# Patient Record
Sex: FEMALE | Race: WHITE | Employment: OTHER | URBAN - METROPOLITAN AREA
[De-identification: names, ages, dates, MRNs, and addresses within clinical notes are randomized per-mention and may not be internally consistent; named-entity substitution may affect disease eponyms.]

---

## 2017-11-03 ENCOUNTER — HOSPITAL ENCOUNTER (OUTPATIENT)
Dept: MAMMOGRAPHY | Age: 72
Discharge: HOME | End: 2017-11-03
Payer: MEDICARE

## 2017-11-03 DIAGNOSIS — Z12.31 VISIT FOR SCREENING MAMMOGRAM: ICD-10-CM

## 2017-11-03 PROCEDURE — 77063 BREAST TOMOSYNTHESIS BI: CPT

## 2018-11-29 ENCOUNTER — HOSPITAL ENCOUNTER (OUTPATIENT)
Dept: MAMMOGRAPHY | Age: 73
Discharge: HOME | End: 2018-11-29
Payer: MEDICARE

## 2018-11-29 DIAGNOSIS — Z12.31 VISIT FOR SCREENING MAMMOGRAM: ICD-10-CM

## 2018-11-29 PROCEDURE — 77063 BREAST TOMOSYNTHESIS BI: CPT

## 2019-12-04 ENCOUNTER — HOSPITAL ENCOUNTER (OUTPATIENT)
Dept: MAMMOGRAPHY | Age: 74
Discharge: HOME | End: 2019-12-04
Attending: INTERNAL MEDICINE
Payer: MEDICARE

## 2019-12-04 DIAGNOSIS — Z12.31 VISIT FOR SCREENING MAMMOGRAM: ICD-10-CM

## 2019-12-04 PROCEDURE — 77063 BREAST TOMOSYNTHESIS BI: CPT

## 2020-11-04 ENCOUNTER — TRANSCRIBE ORDER (OUTPATIENT)
Dept: SCHEDULING | Age: 75
End: 2020-11-04

## 2020-11-04 DIAGNOSIS — Z12.31 VISIT FOR SCREENING MAMMOGRAM: Primary | ICD-10-CM

## 2020-11-14 ENCOUNTER — HOSPITAL ENCOUNTER (OUTPATIENT)
Dept: MAMMOGRAPHY | Age: 75
Discharge: HOME | End: 2020-11-14
Attending: INTERNAL MEDICINE
Payer: MEDICARE

## 2020-11-14 DIAGNOSIS — Z12.31 VISIT FOR SCREENING MAMMOGRAM: ICD-10-CM

## 2020-11-14 PROCEDURE — 77063 BREAST TOMOSYNTHESIS BI: CPT

## 2021-11-16 ENCOUNTER — TRANSCRIBE ORDER (OUTPATIENT)
Dept: SCHEDULING | Age: 76
End: 2021-11-16

## 2021-11-16 DIAGNOSIS — Z12.31 VISIT FOR SCREENING MAMMOGRAM: Primary | ICD-10-CM

## 2021-11-26 ENCOUNTER — HOSPITAL ENCOUNTER (OUTPATIENT)
Dept: WOMENS IMAGING | Age: 76
Discharge: HOME | End: 2021-11-26
Attending: FAMILY MEDICINE
Payer: MEDICARE

## 2021-11-26 DIAGNOSIS — Z12.31 VISIT FOR SCREENING MAMMOGRAM: ICD-10-CM

## 2021-11-26 PROCEDURE — 77063 BREAST TOMOSYNTHESIS BI: CPT

## 2021-12-21 ENCOUNTER — HOSPITAL ENCOUNTER (OUTPATIENT)
Dept: PHYSICAL THERAPY | Age: 76
Discharge: HOME | End: 2021-12-21
Payer: MEDICARE

## 2021-12-21 PROCEDURE — 97162 PT EVAL MOD COMPLEX 30 MIN: CPT

## 2021-12-21 NOTE — PROGRESS NOTES
PHYSICAL THERAPY - DAILY TREATMENT NOTE    Patient Name: Ivy Garcia        Date: 2021  : 1945   YES Patient  Verified  Visit #:     Insurance: Payor: Roberta Marianoiver / Plan: [place] MEDICARE PART A & B / Product Type: Medicare /      In time: 8:07 Out time: 8:47   Total Treatment Time: 40     Medicare Time Tracking (below)   Total Timed Codes (min):  0 1:1 Treatment Time:  0     TREATMENT AREA =  Lumbar radiculopathy    SUBJECTIVE    Pain Level (on 0 to 10 scale):  6  / 10   Medication Changes/New allergies or changes in medical history, any new surgeries or procedures? NO    If yes, update Summary List   Subjective Functional Status/Changes:  []  No changes reported     States she began experiencing mild back pain after CHELY in  and . States she has been doing exercises in the water which significantly helped the pain. Although states she continued to have episodes of pain. She ambulates with a rollator today and states she has used this since she fell on 10/24/2021. Prior to fall she was using a SPC in her home and community. States she has had increased pain in her low back and down her right leg (to knee) since the fall. States she was using a SPC in her back yard when she fell. She reports 1 other fall in the past 6 months. She lives alone during the weeks and with her son on the weekends. Her son is special needs. States she cannot lie on right side     She went to ER after the fall because she could not get up. States x rays were taken.      Patient expresses fear of falling several times throughout eval.         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 0/10 Psoas (L2,3) 3 (p! 6/10) 4   Ext 25% P! 0/10 Quads (L3) 5 5   R-lat flex 50% P! 0/10 Ant tib(L4)     L-lat flex 50% P! 6/10 Ext Martínez (L4,L5)     R-rot   Glut Med(L5) 2 (p! 8/10) 4   L-rot   Peroneals (L5,S1)        Hamstrings(S1,S2) 3 (p! 8/10) 4     Strength (MMT)       Right     Left          Gastroc (S1,S2)     Glut Max (S1,S2) 3 3        Core: Sup Bridge     Core: Side Bridge     Core: Prone plank            Special Tests                       Right     Left          Flexibility         Right              Left    Slump neg  90/90 HS WNL WNL   SLR neg  Tray Lemmings     Sl screen 3/5=70% LR   Camille     Gaenslen test   Hip IR (prone)     Avi/DANYEL sign   Hip ER (prone)     Thigh thrust        Distraction        Lateral Compression                  Effect of:  DKC    Supine Bridge    SL Supine Bridge    Prone on Elbows    RFIS    REIL             min Patient Education:  YES  Reviewed HEP   []  Progressed/Changed HEP based on:   Discussed POC with patient      Other Objective/Functional Measures:    See eval    ABC Scale: 11.25%     Post Treatment Pain Level (on 0 to 10) scale:   6  / 10     ASSESSMENT  Assessment/Changes in Function:     Justification for Eval Code Complexity:  Patient History (low 0, mod 1-2, high 3-4): high (arthritis, heart murmur, osteoporosis, B CHELY, h/o cataract removal)  Examination (low 1-2, mod 3+, high 4+): mod (see above)  Clinical Presentation (low stable or uncomplicated, mod evolving or changing, high unstable or unpredictable): mod  Clinical Decision Making (low , mod 26-74, high 1-25): FOTO = 36/100     []  See Progress Note/Recertification   Patient will continue to benefit from skilled PT services to modify and progress therapeutic interventions, address functional mobility deficits, address ROM deficits, address strength deficits, analyze and address soft tissue restrictions, analyze and cue movement patterns, analyze and modify body mechanics/ergonomics, assess and modify postural abnormalities, address imbalance/dizziness and instruct in home and community integration to attain remaining goals. Progress toward goals / Updated goals:    Goals established. PLAN    [x]  Upgrade activities as tolerated YES Continue plan of care   []  Discharge due to :    []  Other:      Therapist: Amy Ellis PT    Date: 12/21/2021 Time: 8:16 AM     No future appointments.

## 2021-12-21 NOTE — PROGRESS NOTES
[address] [facility] PHYSICAL THERAPY  [address] [place] [address] Robert Chisholm, [address] [employer] [address] - Phone: [phone]  Fax: [phone] / 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   ===========================================================================================  Assessment / key information:  Patient is a 68y.o. year old female with chief complaint of low back and right leg pain after a fall in her backyard on 10/24/2021. Prior to fall she had been using a SPC for home and community ambulation; now she is using a rollator. Patient reports a fear of falling several times throughout the eval.  Patient is unable to report activities which decrease pain, but she states she is unable to lie on right side due to pain. Objective findings: 1) decreased lumbar AROM, 2) decreased strength in right LE > left LE, 3) tenderness to palpation over right posterior greater trochanter and down iliotibial band, 4) negative SLR and slump tests, 5) score of 11.25% on the Activities-specific Balance Confidence Scale, indicating indicating a low level of physical functioning and an increased fall risk. Patient will benefit from skilled physical therapy services to address these issues.   Recommend assessing and treating imbalance to improve safety with gait in addiction to treatment for lumbar radiculopathy. Thank you for this referral.     Clementina Edelmira (Focused on Therapeutic Outcomes) Functional Status score = 36/100, which corresponds to a functional limitation of 64%.     L/S ROM      Range   Effect  Strength (MMT)          Right        Left    Flex 50% P! 0/10 Psoas (L2,3) 3 (p! 6/10) 4   Ext 25% P! 0/10 Quads (L3) 5 5   R-lat flex 50% P! 0/10 Ant tib(L4)       L-lat flex 50% P! 6/10 Ext Martínez (L4,L5)       R-rot     Glut Med(L5) 2 (p! 8/10) 4   L-rot     Peroneals (L5,S1)             Hamstrings(S1,S2) 3 (p! 8/10) 4      Strength (MMT)       Right     Left          Gastroc (S1,S2)       Glut Max (S1,S2) 3 3       ===========================================================================================  Eval Complexity: History: HIGH Complexity :3+ comorbidities / personal factors will impact the outcome/ POC Exam:MEDIUM Complexity : 3 Standardized tests and measures addressing body structure, function, activity limitation and / or participation in recreation  Presentation: MEDIUM Complexity : Evolving with changing characteristics  Clinical Decision Making:MEDIUM Complexity : FOTO score of 26-74Overall Complexity:MEDIUM    Problem List: pain affecting function, decrease ROM, decrease strength, impaired gait/ balance, decrease ADL/ functional abilitiies, decrease activity tolerance, decrease flexibility/ joint mobility and decrease transfer abilities   Treatment Plan may include any combination of the following: Therapeutic exercise, Therapeutic activities, Neuromuscular re-education, Physical agent/modality, Gait/balance training, Manual therapy and Patient education  Patient / Family readiness to learn indicated by: asking questions, trying to perform skills and interest  Persons(s) to be included in education: patient (P)  Barriers to Learning/Limitations: None  Measures taken:    Patient Goal (s): \"stop the pain\"   Patient self reported health status: good  Rehabilitation Potential: good   Short Term Goals: To be accomplished in  3-4  weeks:  1. Complete Functional Gait Assessment (FGA) or Tinetti to further assess fall risk. 2.  Patient will be compliant with home exercise program.  3.  Complete Wilburn Balance Scale to assess safety with ADL's.  Long Term Goals: To be accomplished in  6-8  weeks:  1. Patient will increase FOTO Functional Status score to 58/100 to indicate decreased functional limitations. 2.  Patient will report little difficulty with getting in and out of a chair to improve safety and independence with functional mobility. 3.  Patient will be independent with home exercise program for self management of symptoms. 4.  Patient will report little difficulty with bathing and dressing to improve safety and independence with self care activities. 5.  Patient will increase score on FGA or Tinetti by 4 points, if applicable, to increase safety with ADL's and gait. Frequency / Duration:   Patient to be seen  2  times per week for 6-8  weeks:  Patient / Caregiver education and instruction: self care    Therapist Signature: Arnold Hernandez PT Date: 26/35/7527   Certification Period: 12/21/2021 - 3/20/2022 Time: 9:20 AM   ===========================================================================================  I certify that the above Physical Therapy Services are being furnished while the patient is under my care. I agree with the treatment plan and certify that this therapy is necessary. Physician Signature:        Date:       Time:                                         Rios Oneill MD    Please sign and return to [employer] or you may fax the signed copy to [phone]. Thank you.

## 2022-01-03 ENCOUNTER — HOSPITAL ENCOUNTER (OUTPATIENT)
Dept: PHYSICAL THERAPY | Age: 77
Discharge: HOME | End: 2022-01-03
Payer: MEDICARE

## 2022-01-03 PROCEDURE — 97110 THERAPEUTIC EXERCISES: CPT

## 2022-01-03 PROCEDURE — 97530 THERAPEUTIC ACTIVITIES: CPT

## 2022-01-03 NOTE — PROGRESS NOTES
PHYSICAL THERAPY - DAILY TREATMENT NOTE    Patient Name: Leslye Fill        Date: 1/3/2022  : 1945   YES Patient  Verified  Visit #:     Insurance: Payor: Shad Nones / Plan: [place] MEDICARE PART A & B / Product Type: Medicare /      In time: 10:30 Out time: 11:20   Total Treatment Time: 50     Medicare/BCBS [place] Time Tracking (below)   Total Timed Codes (min):  50 1:1 Treatment Time:  50     TREATMENT AREA =  Other low back pain [M54.59]    SUBJECTIVE    Pain Level (on 0 to 10 scale):  8  / 10 left>right LE pain   Medication Changes/New allergies or changes in medical history, any new surgeries or procedures? NO    If yes, update Summary List   Subjective Functional Status/Changes:  []  No changes reported     Pt reports on Tuesday she was at her doctor's office for a regular appointment, her left knee was red, hot and swollen so he told her to go to the ER for the pain and swelling in her left knee, they increased her fluid pill and she goes to see her heart doctor in February, but has called them to see if she can get in any earlier. Pt reports the swelling has improved a little, but she still can't get her compression sock on. Pt states her left leg drags when she walks, has been told her legs might be different lengths from her hip surgery. Pt reports her right leg from the sciatica isn't as painful as the left one, but she still can't lift her right leg. OBJECTIVE    30 min Therapeutic Exercise:  [x]  See flow sheet   Rationale:    increase ROM, increase strength, improve coordination, improve balance and increase proprioception to promote increased functional mobility and increased activity tolerance with ADL's.     20 min Therapeutic Activity: Tinetti   static standing balance    Rationale:    increase ROM, increase strength, improve coordination, improve balance and increase proprioception to promote increased functional mobility and increased activity tolerance with ADL's.      min Patient Education:  YES  Reviewed HEP   []  Progressed/Changed HEP based on:   Educated pt on lymphedema for treatment of chronic LE swelling, advised pt to discuss this with her heart doctor to see if it's an option for her, pt agreeable. Other Objective/Functional Measures:    Pt arrived 13' late for appointment advising she had difficulty getting dressed and walking because of her left>right leg pain. Pt presents to PT ambulating with rollator and antalgic gait, decreased stance time left LE. Pt demo's LE edema from knee down left>right LE.     ROM EO/EC 30\"/10\"   Tinetti: 8/28      Post Treatment Pain Level (on 0 to 10) scale:   8  / 10     ASSESSMENT    Assessment/Changes in Function:     Pt's Tinetti Balance assessment score indicates pt at high risk for falling. []  See Progress Note/Recertification   Patient will continue to benefit from skilled PT services to modify and progress therapeutic interventions, address functional mobility deficits, address ROM deficits, address strength deficits, analyze and address soft tissue restrictions, analyze and cue movement patterns, assess and modify postural abnormalities, and instruct in home and community integration to attain remaining goals. Progress toward goals / Updated goals:    1. Complete Functional Gait Assessment (FGA) or Tinetti to further assess fall risk. completed Tinetti 1/3/22=8/28  2. Patient will be compliant with home exercise program.  3.  Complete Wilburn Balance Scale to assess safety with ADL's.       PLAN    []  Upgrade activities as tolerated YES Continue plan of care   []  Discharge due to :    []  Other:      Therapist: Rhys Zarate PTA    Date: 1/3/2022 Time: 10:38 AM     Future Appointments   Date Time Provider Clare Grider   1/6/2022 11:00 AM Foreign Mosley PT [facility]   1/10/2022 11:45 AM ELIANE Mendez [facility]   1/13/2022 11:00 AM ELIANE Mcclure [facility] 1/17/2022 11:00 AM Raissa Tracy, PT [facility]   1/20/2022 11:00 AM Telly Byers, PT [facility]   1/24/2022  8:45 AM Telly Byers PT [facility]   1/27/2022 11:00 AM Telly Byers PT [facility]   1/31/2022 11:45 AM Raissa Tracy, PT [facility]

## 2022-01-06 ENCOUNTER — HOSPITAL ENCOUNTER (OUTPATIENT)
Dept: PHYSICAL THERAPY | Age: 77
Discharge: HOME | End: 2022-01-06
Payer: MEDICARE

## 2022-01-06 PROCEDURE — 97110 THERAPEUTIC EXERCISES: CPT

## 2022-01-06 NOTE — PROGRESS NOTES
PHYSICAL THERAPY - DAILY TREATMENT NOTE    Patient Name: Wandy Memos        Date: 2022  : 1945   YES Patient  Verified  Visit #:   3   of   12  Insurance: Payor: Colin Erickson / Plan: [place] MEDICARE PART A & B / Product Type: Medicare /      In time: 11:15 Out time: 11:55   Total Treatment Time: 40     Medicare/BCBS [place] Time Tracking (below)   Total Timed Codes (min):  40 1:1 Treatment Time:  40     TREATMENT AREA =  Other low back pain [M54.59]    SUBJECTIVE    Pain Level (on 0 to 10 scale):  10  / 10   Medication Changes/New allergies or changes in medical history, any new surgeries or procedures? yes    If yes, update Summary List   Subjective Functional Status/Changes:  []  No changes reported     \"Ever since the last session here, I have been sleeping through the night. \"  Reports she has a f/u with PCP on 2022 and with cardiologist on 22. Patient rates pain in left LE at 10/10 but states she does not feel that she needs to go to the ER. Patient states ER MD increased dosage of Flurosemide to 20 mg 2x/day on 2021. She is to continue this dosage until she has f/u with PCP. OBJECTIVE    40 min Therapeutic Exercise:  [x]  See flow sheet   Rationale:      increase ROM, increase strength, improve coordination, and increase proprioception to improve the patients ability to perform ADL's, gait, and functional mobility with decreased pain and improved joint mechanics. min Patient Education:  YES  Reviewed HEP   []  Progressed/Changed HEP based on:   Discussed possibility of treatment for lymphedema in B LE's but advised patient to f/u with PCP and cardiac MD to discuss appropriateness of lymphedema due to cardiac issues;   Issued HEP per handout     Other Objective/Functional Measures:    Exercises per flowsheet     Post Treatment Pain Level (on 0 to 10) scale:   6  / 10     ASSESSMENT    Assessment/Changes in Function:     Patient with reports of increased left knee pain with H/L hip abduction. []  See Progress Note/Recertification   Patient will continue to benefit from skilled PT services to modify and progress therapeutic interventions, address functional mobility deficits, address ROM deficits, address strength deficits, analyze and address soft tissue restrictions, analyze and cue movement patterns, analyze and modify body mechanics/ergonomics, assess and modify postural abnormalities, address imbalance/dizziness and instruct in home and community integration to attain remaining goals. Progress toward goals / Updated goals:    1.  Complete Functional Gait Assessment (FGA) or Tinetti to further assess fall risk. MET previously  2.  Patient will be compliant with home exercise program. Issued HEP per handout  3.  Complete Wilburn Balance Scale to assess safety with ADL's.       PLAN    [x]  Upgrade activities as tolerated YES Continue plan of care   []  Discharge due to :    []  Other:      Therapist: Emerald Hoffman PT    Date: 1/6/2022 Time: 11:28 AM     Future Appointments   Date Time Provider Clare Grider   1/10/2022 11:45 AM Meredith Vargas, PT [facility]   1/13/2022 11:00 AM Tyler Foster, PT [facility]   1/17/2022 11:00 AM Meredith Vargas, PT [facility]   1/20/2022 11:00 AM Tyler Foster, PT [facility]   1/24/2022  8:45 AM Tyler Foster, PT [facility]   1/27/2022 11:00 AM Tyler Foster, PT [facility]   1/31/2022 11:45 AM Meredith Vargas, PT [facility]

## 2022-01-10 ENCOUNTER — APPOINTMENT (OUTPATIENT)
Dept: PHYSICAL THERAPY | Age: 77
End: 2022-01-10
Payer: MEDICARE

## 2022-01-13 ENCOUNTER — HOSPITAL ENCOUNTER (OUTPATIENT)
Dept: PHYSICAL THERAPY | Age: 77
End: 2022-01-13
Payer: MEDICARE

## 2022-01-17 ENCOUNTER — HOSPITAL ENCOUNTER (OUTPATIENT)
Dept: PHYSICAL THERAPY | Age: 77
Discharge: HOME | End: 2022-01-17
Payer: MEDICARE

## 2022-01-17 PROCEDURE — 97110 THERAPEUTIC EXERCISES: CPT

## 2022-01-17 NOTE — PROGRESS NOTES
PHYSICAL THERAPY - DAILY TREATMENT NOTE    Patient Name: Wandy Memos        Date: 2022  : 1945   YES Patient  Verified  Visit #:     Insurance: Payor: Colin Erickson / Plan: [place] MEDICARE PART A & B / Product Type: Medicare /      In time: 11:05 Out time: 11:45   Total Treatment Time: 40     Medicare/BCBS [place] Time Tracking (below)   Total Timed Codes (min):  40 1:1 Treatment Time:  40     TREATMENT AREA =  Other low back pain [M54.59]    SUBJECTIVE    Pain Level (on 0 to 10 scale):  7  / 10   Medication Changes/New allergies or changes in medical history, any new surgeries or procedures? NO    If yes, update Summary List   Subjective Functional Status/Changes:  []  No changes reported     Pt reports going to the ER recently per PCP recommendation to address significant swelling in legs. She reports increasing dosage for water pill and has lost 6 lbs and feels better in legs. Reports pain in LLE when standing at sink washing dishes. She feels PT is helping with pain symtpoms and likes the exercises           OBJECTIVE    40 min Therapeutic Exercise:  [x]  See flow sheet   Rationale:      increase ROM and increase strength to improve the patients ability to perform ADL's with greater ease. min Patient Education:  YES  Reviewed HEP   []  Progressed/Changed HEP based on:   Cont with HEP     Other Objective/Functional Measures:    Limited knee extension observed during seated LAQ    Difficulty negotiating stairs, ascends stairs with circumduction      Post Treatment Pain Level (on 0 to 10) scale:   5  / 10     ASSESSMENT    Assessment/Changes in Function:     Strength deficits noted in transverse abdominus during TA draw n hooklying, would benefit from further core strengthening. Pain reproduced in leg with supine heel slides, modified to seated position.        []  See Progress Note/Recertification   Patient will continue to benefit from skilled PT services to analyze, cue, progress, modify,, demonstrate, instruct, and address, movement patterns, therapeutic interventions, postural abnormalities, soft tissue restrictions, ROM, strength, functional mobility, body mechanics/ergonomics, and home and community integration, to attain remaining goals. Progress toward goals / Updated goals: · Short Term Goals: To be accomplished in  3-4  weeks:  1. Complete Functional Gait Assessment (FGA) or Tinetti to further assess fall risk.   2.  Patient will be compliant with home exercise program.  3.  Complete Wilburn Balance Scale to assess safety with ADL's     PLAN    []  Upgrade activities as tolerated YES Continue plan of care   []  Discharge due to :    []  Other:      Therapist: Reg Malik PT, DPT    Date: 1/17/2022 Time: 11:21 AM     Future Appointments   Date Time Provider Clare Grider   1/20/2022 11:00 AM Rose Starks PT [facility]   1/24/2022  8:45 AM Rose Starks PT [facility]   1/27/2022 11:00 AM Rose Starks PT [facility]   1/31/2022 11:45 AM ELIANE Cardenas [facility]

## 2022-01-20 ENCOUNTER — HOSPITAL ENCOUNTER (OUTPATIENT)
Dept: PHYSICAL THERAPY | Age: 77
Discharge: HOME | End: 2022-01-20
Payer: MEDICARE

## 2022-01-20 PROCEDURE — 97110 THERAPEUTIC EXERCISES: CPT

## 2022-01-20 NOTE — PROGRESS NOTES
[facility] PHYSICAL THERAPY  [address] [place] [address] Ivet Chisholm, [address] - Phone: [phone]  Fax: [phone] OF CARE FOR PHYSICAL THERAPY          Patient Name: Juan Benz : 1945   Treatment/Medical Diagnosis: Other low back pain [M54.59]   Onset Date: 10/24/2021 (fall)    Referral Source: Brannon Bacon MD Start of Care [facility]): 2021   Prior Hospitalization: See Medical History Provider #: [id number]   Prior Level of Function: Ambulating with SPC; cares for special needs son on weekends   Comorbidities: arthritis, heart murmur, osteoporosis, B CHELY, h/o cataract removal   Medications: Verified on Patient Summary List   [facility] from [facility]: 5 Missed Visits: 2     Goal/Measure of Progress Goal Met? 1. Complete Functional Gait Assessment (FGA) or Tinetti to further assess fall risk. Status at last Eval: Tinetti =  (on 1/3/2022) Current Status: Tinetti = 15/28 yes   2. Patient will be compliant with home exercise program.   Status at last Eval: NA Current Status: Compliant with HEP yes   3. Complete Wilburn Balance Scale to assess safety with ADL's. Status at last Eval: NA Current Status: NT n/a     Key Functional Changes/Progress: Patient has progressed very well with physical therapy for low back and right LE pain. She reports a 100% improvement in this pain. However, she has recently reported intense left knee pain and has a f/u scheduled with her cardiologist (she feels pain is related to excess fluid in LE). She continues to use her rollator for ambulation and demonstrates imbalance.      L/S ROM      Range   Effect  Strength (MMT)          Right        Left    Flex 75% P! 0/10 Psoas (L2,3) 5 no p! 3 (p! 5/10)   Ext 75% P! 0/10 Quads (L3) 5  NT due to left knee pain   R-lat flex 100% P! 0/10 Ant tib(L4)       L-lat flex 100% P! 0/10 Ext Martínez (L4,L5)       R-rot     Glut Med(L5) 3 no p! 3 (p! 5/10)   L-rot     Peroneals (L5,S1)           Hamstrings(S1,S2) 5 NT due to left knee pain      Strength (MMT)       Right     Left          Gastroc (S1,S2)       Glut Max (S1,S2)          EO ROM: 30\"  EC ROM: 3\"     Tinetti: 15/28  ABC Scale:  16.25% (with SPC)  ABC Scale: 30.625% (with rollator)    Problem List: pain affecting function, decrease ROM, decrease strength, impaired gait/ balance, decrease ADL/ functional abilitiies, decrease activity tolerance, decrease flexibility/ joint mobility and decrease transfer abilities   Treatment Plan may include any combination of the following: Therapeutic exercise, Therapeutic activities, Neuromuscular re-education, Physical agent/modality, Gait/balance training, Manual therapy and Patient education  Patient Goal(s) has been updated and includes:      Goals for this certification period include and are to be achieved in   4  weeks:  1. Patient will score > 18/28 on the Tinetti to increase safety with gait. 2. Patient will demonstrate eyes closed ROM for 15\" or better to increase safety in challenging environments. 3.  Patient will score > 30% on ABC Scale (with SPC) to decreased fall risk. Frequency / Duration:   Patient to be seen   2   times per week for   4    weeks:    Assessments/Recommendations: Continue skilled physical therapy services to focus on static and dynamic standing balance to increase safety with gait. If you have any questions/comments please contact us directly at [phone]. Thank you for allowing us to assist in the care of your patient. Therapist Signature: Laura Aceves PT Date: 2/58/7569   Certification Period:  Reporting Period: 12/21/2021 - 3/20/2022  12/21/2021 - 1/20/2022 Time: 1:26 PM   NOTE TO PHYSICIAN:  PLEASE COMPLETE THE ORDERS BELOW AND FAX TO   [facility] Physical Therapy: [phone].   If you are unable to process this request in 24 hours please contact our office: [phone].    ___ I have read the above report and request that my patient continue as recommended.   ___ I have read the above report and request that my patient continue therapy with the following changes/special instructions:_________________________________________________________   ___ I have read the above report and request that my patient be discharged from therapy.      Physician Signature:        Date:       Time:                                        Katherine Alva MD

## 2022-01-20 NOTE — PROGRESS NOTES
PHYSICAL THERAPY - DAILY TREATMENT NOTE    Patient Name: Jayson Mckinley        Date: 2022  : 1945   YES Patient  Verified  Visit #:     Insurance: Payor: Lilian Fuller / Plan: [place] MED[place] PART [place] & B / Product Type: Medicare /      In time: 11:05 Out time: 11:48   Total Treatment Time: 43     Medicare/BCBS [place] Time Tracking (below)   Total Timed Codes (min):  43 1:1 Treatment Time:  43     TREATMENT AREA =  Other low back pain [M54.59]    SUBJECTIVE    Pain Level (on 0 to 10 scale):  9  / 10 (left knee)   Medication Changes/New allergies or changes in medical history, any new surgeries or procedures?    no    If yes, update Summary List   Subjective Functional Status/Changes:  []  No changes reported     Patient reports a 100% improvement in low back and right LE pain since beginning therapy. She now reports pain in her left knee which has gotten better since her cardiologist increased her diuretics. OBJECTIVE    43 min Therapeutic Exercise:  [x]  See flow sheet   Rationale:      increase ROM, increase strength, improve coordination, improve balance, and increase proprioception to improve the patients ability to perform ADL's, gait, and functional mobility with increased safety and independence and decreased pain.       min Patient Education:  YES  Reviewed HEP   []  Progressed/Changed HEP based on:   Cont HEP     Other Objective/Functional Measures:    L/S ROM      Range   Effect  Strength (MMT)          Right        Left    Flex 75% P! 0/10 Psoas (L2,3) 5 no p! 3 (p! 5/10)   Ext 75% P! 0/10 Quads (L3) 5  NT due to left knee pain   R-lat flex 100% P! 0/10 Ant tib(L4)     L-lat flex 100% P! 0/10 Ext Martínez (L4,L5)     R-rot     Glut Med(L5) 3 no p! 3 (p! 5/10)   L-rot     Peroneals (L5,S1)           Hamstrings(S1,S2) 5 NT due to left knee pain      Strength (MMT)       Right     Left          Gastroc (S1,S2)       Glut Max (S1,S2)        EO ROM: 30\"  EC ROM: 3\"    Tinetti: 15/28  ABC Scale:        Trendelenburg gait pattern on left. Post Treatment Pain Level (on 0 to 10) scale:   8 / 10     ASSESSMENT    Assessment/Changes in Function:     See PN to MD     []  See Progress Note/Recertification   Patient will continue to benefit from skilled PT services to modify and progress therapeutic interventions, address functional mobility deficits, address ROM deficits, address strength deficits, analyze and address soft tissue restrictions, analyze and cue movement patterns, analyze and modify body mechanics/ergonomics, assess and modify postural abnormalities, address imbalance/dizziness and instruct in home and community integration to attain remaining goals.      Progress toward goals / Updated goals:    See PN to MD     PLAN    [x]  Upgrade activities as tolerated YES Continue plan of care   []  Discharge due to :    []  Other:      Therapist: Fe Zambrano PT    Date: 1/20/2022 Time: 11:10 AM     Future Appointments   Date Time Provider Clare Grider   1/24/2022  8:45 AM Toy Hernandez PT [facility]   1/27/2022 11:00 AM Toy Hernandez PT [facility]   1/31/2022 11:45 AM ELIANE Sandoval [facility]

## 2022-01-24 ENCOUNTER — HOSPITAL ENCOUNTER (OUTPATIENT)
Dept: PHYSICAL THERAPY | Age: 77
Discharge: HOME | End: 2022-01-24
Payer: MEDICARE

## 2022-01-24 PROCEDURE — 97110 THERAPEUTIC EXERCISES: CPT

## 2022-01-24 PROCEDURE — 97112 NEUROMUSCULAR REEDUCATION: CPT

## 2022-01-24 NOTE — PROGRESS NOTES
PHYSICAL THERAPY - DAILY TREATMENT NOTE    Patient Name: Shaye Hopson        Date: 2022  : 1945   YES Patient  Verified  Visit #:     Insurance: Payor: Marciallisset [place]jossue / Plan: [place] MEDICARE PART A & B / Product Type: Medicare /      In time: 8:47 Out time: 9:26   Total Treatment Time: 39     Medicare/BCBS [place] Time Tracking (below)   Total Timed Codes (min):  39 1:1 Treatment Time:  39     TREATMENT AREA =  Other low back pain [M54.59]    SUBJECTIVE    Pain Level (on 0 to 10 scale):  7  / 10   Medication Changes/New allergies or changes in medical history, any new surgeries or procedures?    no    If yes, update Summary List   Subjective Functional Status/Changes:  []  No changes reported     States she saw her cardiologist and she is scheduled for an echocardiogram on 2022. States her left knee has been feeling better but the pain increased this morning when trying to get dressed. States pain in left knee has been decreasing since the \"cardiologist increased the dosage of my fluid pill. \"         OBJECTIVE    24 min Therapeutic Exercise:  [x]  See flow sheet   Rationale:      increase ROM, increase strength, improve coordination, improve balance, and increase proprioception to improve the patients ability to perform ADL's, gait, and functional mobility with increased safety and independence. 15 min Neuromuscular Re-ed: EO/EC balance exercises per flowsheet   Rationale:      increase ROM, increase strength, improve coordination, improve balance, and increase proprioception to improve the patients ability to perfom ADL's, gait, and functional mobility with increased safety and independence. min Patient Education:  YES  Reviewed HEP   []  Progressed/Changed HEP based on: Added EO/EC balance exercises per handout     Other Objective/Functional Measures:    Began EO/EC balance exercises.     EO MSR(instep): 30\" B  EC stance: 30\"     Post Treatment Pain Level (on 0 to 10) scale:   2  / 10     ASSESSMENT    Assessment/Changes in Function:     Patient tolerated exercises with only 1 episode of brief pain in right hip. Reports decreased pain in left knee upon completion of exercises. Patient continues to demonstrate Trendelenburg gait pattern which is accentuated when she tries to take a few steps without rollator (ie from parallel bars to mat). []  See Progress Note/Recertification   Patient will continue to benefit from skilled PT services to modify and progress therapeutic interventions, address functional mobility deficits, address ROM deficits, address strength deficits, analyze and address soft tissue restrictions, analyze and cue movement patterns, analyze and modify body mechanics/ergonomics, assess and modify postural abnormalities, address imbalance/dizziness and instruct in home and community integration to attain remaining goals. Progress toward goals / Updated goals:    · Goals for this certification period include and are to be achieved in   4  weeks:  1. Patient will score > 18/28 on the Tinetti to increase safety with gait. Began balance exercises. 2. Patient will demonstrate eyes closed ROM for 15\" or better to increase safety in challenging environments. EC stance = 30\"  3. Patient will score > 30% on ABC Scale (with SPC) to decreased fall risk.      PLAN    [x]  Upgrade activities as tolerated YES Continue plan of care   []  Discharge due to :    []  Other:      Therapist: Bean Schilling PT    Date: 1/24/2022 Time: 8:50 AM     Future Appointments   Date Time Provider Clare Grider   1/27/2022 11:00 AM Francis Haskins PT [facility]   1/31/2022 11:45 AM [facility]

## 2022-01-27 ENCOUNTER — APPOINTMENT (OUTPATIENT)
Dept: PHYSICAL THERAPY | Age: 77
End: 2022-01-27
Payer: MEDICARE

## 2022-01-31 ENCOUNTER — APPOINTMENT (OUTPATIENT)
Dept: PHYSICAL THERAPY | Age: 77
End: 2022-01-31
Payer: MEDICARE

## 2022-02-03 ENCOUNTER — HOSPITAL ENCOUNTER (OUTPATIENT)
Dept: PHYSICAL THERAPY | Age: 77
Discharge: HOME | End: 2022-02-03
Payer: MEDICARE

## 2022-02-03 PROCEDURE — 97112 NEUROMUSCULAR REEDUCATION: CPT

## 2022-02-03 PROCEDURE — 97110 THERAPEUTIC EXERCISES: CPT

## 2022-02-03 NOTE — PROGRESS NOTES
PHYSICAL THERAPY - DAILY TREATMENT NOTE    Patient Name: Dylan Og        Date: 2/3/2022  : 1945   YES Patient  Verified  Visit #:     Insurance: Payor: Anya Begum / Plan: [place] MEDICARE PART A & B / Product Type: Medicare /      In time: 11:37 Out time: 12:20   Total Treatment Time: 43     Medicare/BCBS [place] Time Tracking (below)   Total Timed Codes (min):  43 1:1 Treatment Time:  43     TREATMENT AREA =  Other low back pain [M54.59]    SUBJECTIVE    Pain Level (on 0 to 10 scale):  0  / 10   Medication Changes/New allergies or changes in medical history, any new surgeries or procedures? NO    If yes, update Summary List   Subjective Functional Status/Changes:  []  No changes reported     Pt reports feeling great, \"I lost 10 lb in water weight. \"          OBJECTIVE    20 min Therapeutic Exercise:  [x]  See flow sheet   Rationale:      increase ROM and increase strength to improve the patients ability to perform ADL's with greater ease. 23 min Neuromuscular Re-ed: See flow sheet   Rationale:   improve balance, coordination, proprioception to improve patient's ability to complete functional tasks safely. min Patient Education:  YES  Reviewed HEP   []  Progressed/Changed HEP based on:   Cont with HEP     Other Objective/Functional Measures:    Continued with emphasis on balance/fall prevention program per flow sheet. EC Romberg within parallel bars included, SBA for safety; pt demonstrated much difficulty with this, LOB approx 4x during 30\" set. Gait training included using gait belt for safety due to high fall risk. Post Treatment Pain Level (on 0 to 10) scale:   0  / 10     ASSESSMENT    Assessment/Changes in Function:     Pt reports mild muscle fatigue at the end of treatment, no knee or lower back pain. Continue working to improve balance and proprioception to reduce risk for falls.       []  See Progress Note/Recertification   Patient will continue to benefit from skilled PT services to analyze, cue, progress, modify,, demonstrate, instruct, and address, movement patterns, therapeutic interventions, postural abnormalities, soft tissue restrictions, ROM, strength, functional mobility, body mechanics/ergonomics, and home and community integration, to attain remaining goals. Progress toward goals / Updated goals:    1. Patient will score > 18/28 on the Tinetti to increase safety with gait. 2. Patient will demonstrate eyes closed ROM for 15\" or better to increase safety in challenging environments. 3.  Patient will score > 30% on ABC Scale (with SPC) to decreased fall risk.        PLAN    []  Upgrade activities as tolerated YES Continue plan of care   []  Discharge due to :    []  Other:      Therapist: Sabas Thomas PT, DPT    Date: 2/3/2022 Time: 8:31 AM     Future Appointments   Date Time Provider Clare Grider   2/3/2022 11:45 AM Eber Miller PT [facility]   2/7/2022 11:00 AM Eber Miller PT [facility]   2/10/2022 11:45 AM Eber Miller PT [facility]   2/14/2022 11:00 AM Eber Miller PT [facility]   2/17/2022 11:45 AM Eber Miller PT [facility]   2/21/2022 11:00 AM Min MAHMOOD [facility]   2/24/2022 11:45 AM ELIANE Osorio [facility]

## 2022-02-07 ENCOUNTER — HOSPITAL ENCOUNTER (OUTPATIENT)
Dept: PHYSICAL THERAPY | Age: 77
Discharge: HOME | End: 2022-02-07
Payer: MEDICARE

## 2022-02-07 PROCEDURE — 97112 NEUROMUSCULAR REEDUCATION: CPT

## 2022-02-07 PROCEDURE — 97110 THERAPEUTIC EXERCISES: CPT

## 2022-02-07 NOTE — PROGRESS NOTES
PHYSICAL THERAPY - DAILY TREATMENT NOTE    Patient Name: Doretha Trevizo        Date: 2022  : 1945   YES Patient  Verified  Visit #:     Insurance: Payor: Jaimee Obregon / Plan: [place] MEDICARE PART A & B / Product Type: Medicare /      In time: 11:03 Out time: 11:48   Total Treatment Time: 45     Medicare/BCBS [place] Time Tracking (below)   Total Timed Codes (min):  45 1:1 Treatment Time:  45     TREATMENT AREA =  Other low back pain [M54.59]    SUBJECTIVE    Pain Level (on 0 to 10 scale):  0  / 10   Medication Changes/New allergies or changes in medical history, any new surgeries or procedures? NO    If yes, update Summary List   Subjective Functional Status/Changes:  []  No changes reported     I felt really good after last visit, I wasn't even sore. OBJECTIVE    30 min Therapeutic Exercise:  [x]  See flow sheet   Rationale:      increase ROM and increase strength to improve the patients ability to perform ADL's with greater ease. 15 min Neuromuscular Re-ed: See flow sheet   Rationale:   improve balance, coordination, proprioception to improve patient's ability to complete functional tasks safely. min Patient Education:  YES  Reviewed HEP   []  Progressed/Changed HEP based on:   Cont with HEP     Other Objective/Functional Measures:    Cont with outlined TE program emphasizing balance and proprioception. Improved ability to complete seated reaching on [place] disc without losing balance. Included airex for stance EO - pt with decreased stability and frequent LOB observed, required assistance from PT for safety within parallel bars. Post Treatment Pain Level (on 0 to 10) scale:   0  / 10     ASSESSMENT    Assessment/Changes in Function:     Good tolerance to exercises, cont to present as fall risk without use of AD. Poor balance when surface is altered. Would benefit from further balance/proprioceptive training to reduce risk for falling.       []  See Progress Note/Recertification   Patient will continue to benefit from skilled PT services to analyze, cue, progress, modify,, demonstrate, instruct, and address, movement patterns, therapeutic interventions, postural abnormalities, soft tissue restrictions, ROM, strength, functional mobility, body mechanics/ergonomics, and home and community integration, to attain remaining goals. Progress toward goals / Updated goals:    1. Patient will score > 18/28 on the Tinetti to increase safety with gait. 2. Patient will demonstrate eyes closed ROM for 15\" or better to increase safety in challenging environments. 3.  Patient will score > 30% on ABC Scale (with SPC) to decreased fall risk.        PLAN    []  Upgrade activities as tolerated YES Continue plan of care   []  Discharge due to :    []  Other:      Therapist: Rahul Ovalles PT, DPT    Date: 2/7/2022 Time: 10:13 AM     Future Appointments   Date Time Provider Clare Grider   2/7/2022 11:00 AM Christine Travis PT [facility]   2/10/2022 11:45 AM Christine Travis PT [facility]   2/14/2022 11:00 AM Christine Travis PT [facility]   2/17/2022 11:45 AM Christine Travis PT [facility]   2/21/2022 11:00 AM Sam Urban [facility]   2/24/2022 11:45 AM ELIANE Kearney [facility]

## 2022-02-10 ENCOUNTER — HOSPITAL ENCOUNTER (OUTPATIENT)
Dept: PHYSICAL THERAPY | Age: 77
Discharge: HOME | End: 2022-02-10
Payer: MEDICARE

## 2022-02-10 PROCEDURE — 97110 THERAPEUTIC EXERCISES: CPT

## 2022-02-10 PROCEDURE — 97112 NEUROMUSCULAR REEDUCATION: CPT

## 2022-02-10 NOTE — PROGRESS NOTES
PHYSICAL THERAPY - DAILY TREATMENT NOTE    Patient Name: Francie Donald        Date: 2/10/2022  : 1945   YES Patient  Verified  Visit #:     Insurance: Payor: Haider Ramos / Plan: [place] MEDICARE PART A & B / Product Type: Medicare /      In time: 11:50 Out time: 12:30   Total Treatment Time: 40     Medicare/BCBS [place] Time Tracking (below)   Total Timed Codes (min):  40 1:1 Treatment Time:  40     TREATMENT AREA =  Other low back pain [M54.59]    SUBJECTIVE    Pain Level (on 0 to 10 scale):  0  / 10   Medication Changes/New allergies or changes in medical history, any new surgeries or procedures? NO    If yes, update Summary List   Subjective Functional Status/Changes:  []  No changes reported     Patient reports \"it feels so good to be without pain now, I can sleep through the night now. \"         OBJECTIVE    25 min Therapeutic Exercise:  [x]  See flow sheet   Rationale:      increase ROM and increase strength to improve the patients ability to perform ADL's with greater ease. 15 min Neuromuscular Re-ed: See flow sheet   Rationale:   improve balance, coordination, proprioception to improve patient's ability to complete functional tasks safely. min Patient Education:  YES  Reviewed HEP   []  Progressed/Changed HEP based on:   Cont with HEP     Other Objective/Functional Measures:    Progressed with repetitions to improve strength     Post Treatment Pain Level (on 0 to 10) scale:   0  / 10     ASSESSMENT    Assessment/Changes in Function:     Session tolerated well, look to progress with standing activities to mimic functional tasks.       []  See Progress Note/Recertification   Patient will continue to benefit from skilled PT services to analyze, cue, progress, modify,, demonstrate, instruct, and address, movement patterns, therapeutic interventions, postural abnormalities, soft tissue restrictions, ROM, strength, functional mobility, body mechanics/ergonomics, and home and community integration, to attain remaining goals. Progress toward goals / Updated goals:    1. Patient will score > 18/28 on the Tinetti to increase safety with gait. 2. Patient will demonstrate eyes closed ROM for 15\" or better to increase safety in challenging environments.   3.  Patient will score > 30% on ABC Scale (with SPC) to decreased fall risk       PLAN    []  Upgrade activities as tolerated YES Continue plan of care   []  Discharge due to :    []  Other:      Therapist: Jammie Black, PT, DPT    Date: 2/10/2022 Time: 10:14 AM     Future Appointments   Date Time Provider Clare Grider   2/10/2022 11:45 AM Dereck Days, PT [facility]   2/14/2022 11:00 AM Dereck Days, PT [facility]   2/17/2022 11:45 AM Dereck Days, PT [facility]   2/21/2022 11:00 AM Richard Darby [facility]   2/24/2022 11:45 AM Dereck Days, PT [facility]

## 2022-02-14 ENCOUNTER — HOSPITAL ENCOUNTER (OUTPATIENT)
Dept: PHYSICAL THERAPY | Age: 77
Discharge: HOME | End: 2022-02-14
Payer: MEDICARE

## 2022-02-14 PROCEDURE — 97112 NEUROMUSCULAR REEDUCATION: CPT

## 2022-02-14 PROCEDURE — 97110 THERAPEUTIC EXERCISES: CPT

## 2022-02-14 NOTE — PROGRESS NOTES
PHYSICAL THERAPY - DAILY TREATMENT NOTE    Patient Name: Jaimee Suarez        Date: 2022  : 1945   YES Patient  Verified  Visit #:   10   of   12 Insurance: Payor: Mika Wilson / Plan: [place] MED[place] PART [place] & B / Product Type: Medicare /      In time: 11:47 Out time: 12:27   Total Treatment Time: 40     Medicare/BCBS [place] Time Tracking (below)   Total Timed Codes (min):  40 1:1 Treatment Time:  40     TREATMENT AREA =  Other low back pain [M54.59]    SUBJECTIVE    Pain Level (on 0 to 10 scale):  7  / 10 leg pain   Medication Changes/New allergies or changes in medical history, any new surgeries or procedures? NO    If yes, update Summary List   Subjective Functional Status/Changes:  []  No changes reported     Pt reports felt good yesterday, but woke up today with soreness in the back of both her thighs. Pt also states she had to move her car seat back to be able to lift her legs into the car this morning. Pt states she sleeps with a couch throw pillow under her feet, nothing behind her thighs. OBJECTIVE      25 min Therapeutic Exercise:  [x]  See flow sheet   Rationale:    increase ROM, increase strength, improve coordination, improve balance and increase proprioception to promote increased functional mobility and increased activity tolerance with ADL's. 15 min Neuromuscular Re-ed: extension based ex for management of LE radicular symptoms. Rationale:     improve coordination, improve balance and increase proprioception to improve the patients ability to perform ADLs, transfers and gait safely and independently. min Patient Education:  YES  Reviewed HEP   []  Progressed/Changed HEP based on:   Provided pt information regarding ordering a LE wedge online, pt to trial EL to manage LE symptoms with ADL's and report response. Other Objective/Functional Measures:    Trial of EL to address B LE symptoms. Pt reports posterior thigh pain resolved with EL.  Provided pt handout and educated pt on centralization of LE symptoms. Pt able to complete HL and seated core stabilization ex without c/o. Pt c/o right LE sciatica with MIP, 1 set of EL resolved symptoms. Post Treatment Pain Level (on 0 to 10) scale:   0  / 10     ASSESSMENT    Assessment/Changes in Function:     Pt's LE radicular symptom centralizing with extension based ex this session. []  See Progress Note/Recertification   Patient will continue to benefit from skilled PT services to modify and progress therapeutic interventions, address functional mobility deficits, address ROM deficits, address strength deficits, analyze and address soft tissue restrictions, analyze and cue movement patterns, assess and modify postural abnormalities, and instruct in home and community integration to attain remaining goals. Progress toward goals / Updated goals:    1. Patient will score > 18/28 on the Tinetti to increase safety with gait. 2. Patient will demonstrate eyes closed ROM for 15\" or better to increase safety in challenging environments. 3.  Patient will score > 30% on ABC Scale (with SPC) to decreased fall risk.     Addressed LE radicular symptoms with extension based ex     PLAN    []  Upgrade activities as tolerated YES Continue plan of care   []  Discharge due to :    []  Other:      Therapist: Christina Santiago PTA    Date: 2/14/2022 Time: 12:26 PM     Future Appointments   Date Time Provider Clare Grider   2/17/2022 11:45 AM Hemant Garcia PT [facility] [address]   2/21/2022 11:00 AM Allison [facility] [address]   2/24/2022 11:45 AM Hemant Garcia PT [facility] [address]

## 2022-02-17 ENCOUNTER — HOSPITAL ENCOUNTER (OUTPATIENT)
Dept: PHYSICAL THERAPY | Age: 77
Discharge: HOME | End: 2022-02-17
Payer: MEDICARE

## 2022-02-17 PROCEDURE — 97112 NEUROMUSCULAR REEDUCATION: CPT

## 2022-02-17 PROCEDURE — 97530 THERAPEUTIC ACTIVITIES: CPT

## 2022-02-17 NOTE — PROGRESS NOTES
[facility] PHYSICAL THERAPY  [address] [facility] [address] [place], [address] - Phone: [phone]  Fax: [phone]  Progress Note/CONTINUED R Brendan Kan [address] for PHYSICAL THERAPY          Patient Name: Reema Estimable : 1945   Treatment/Medical Diagnosis: Other low back pain [M54.59]   Referral Source: Sly Alberts MD Start of Care [facility]): 2021   Prior Hospitalization: See Medical History Provider #: [id number]   Medications: Verified on Patient Summary List   Visits from [facility]: 11 Missed Visits: 2     Goal/Measure of Progress Goal Met? 1. Patient will score > 18/28 on the Tinetti to increase safety with gait. Status at last Eval: 15/28 Current Status:  yes   2. Patient will demonstrate eyes closed ROM for 15\" or better to increase safety in challenging environments. Status at last Eval: 3\" Current Status: 30\" yes   3. Patient will score > 30% on ABC Scale (with SPC) to decreased fall risk. Status at last Eval: 30.625% Current Status: 36.5 yes     Key Functional Changes/Progress: Reassessment performed today. Pt reports noticing improvement in balance/stability with walking activities primarily on flat surfaces. Continues to feel most unstable when walking up/down ramps, negotiating stairs, and when walking on grass/uneven terrain. Pt reports relying on RW for all community ambulation. Objectively she demonstrates improved ABC score and Tinetti score placing her into a moderate fall risk category (initially in high fall risk category). Thank you for allowing me to assist with this case.      Problem List: pain affecting function, decrease ROM, decrease strength, edema affecting function, impaired gait/ balance, decrease ADL/ functional abilitiies, decrease activity tolerance, decrease flexibility/ joint mobility and decrease transfer abilities   Treatment Plan may include any combination of the following: Therapeutic exercise, Therapeutic activities, Neuromuscular re-education, Physical agent/modality, Gait/balance training, Manual therapy, Patient education, Self Care training, Functional mobility training, Home safety training and Stair training  Patient Goal(s) has been updated and includes:      Goals for this certification period include and are to be achieved in   6  treatments:    1. Patient will score > 24/28 on the Tinetti to increase safety with gait. 2. Patient will demonstrate eyes open ROM on airex for 15\" or better to increase safety in challenging environments including grass. Frequency / Duration:   Patient to be seen   2   times per week for   2-3    weeks:    Assessments/Recommendations: Patient would benefit from continuation of skilled PT services to address deficits noted above. If you have any questions/comments please contact us directly at [phone]. Thank you for allowing us to assist in the care of your patient. Therapist Signature: Suzi Thakkar PT, DPT Date: 7/24/1902   Certification Period:  Reporting Period: 12/21/2021 - 3/20/2022  1/20/2022 - 2/17/2022 Time: 11:58 AM   NOTE TO PHYSICIAN:  PLEASE COMPLETE THE ORDERS BELOW AND FAX TO   [facility] Physical Therapy: [phone]. If you are unable to process this request in 24 hours please contact our office: [phone].    ___ I have read the above report and request that my patient continue as recommended.   ___ I have read the above report and request that my patient continue therapy with the following changes/special instructions: ________________________________________________   ___ I have read the above report and request that my patient be discharged from therapy.      Physician Signature:        Date:       Time:

## 2022-02-17 NOTE — PROGRESS NOTES
PHYSICAL THERAPY - DAILY TREATMENT NOTE    Patient Name: Antonia Hankins        Date: 2022  : 1945   YES Patient  Verified  Visit #:     Insurance: Payor: Dave Joyce / Plan: [place] MEDICARE PART A & B / Product Type: Medicare /      In time: 11:45 Out time: 12:30   Total Treatment Time: 45     Medicare/BCBS [place] Time Tracking (below)   Total Timed Codes (min):  45 1:1 Treatment Time:  45     TREATMENT AREA =  Other low back pain [M54.59]    SUBJECTIVE    Pain Level (on 0 to 10 scale):  0  / 10   Medication Changes/New allergies or changes in medical history, any new surgeries or procedures? NO    If yes, update Summary List   Subjective Functional Status/Changes:  []  No changes reported     Pt reports feeling a significant reduction in back pain with repeated lumbar EXT in standing. OBJECTIVE    30 min Therapeutic Activity: See flow sheet   Rationale:   increase mobility, endurance, and strength to improve patient's ability to complete functional tasks with greater ease. 15 min Neuromuscular Re-ed: See flow sheet   Rationale:   improve balance, coordination, proprioception to improve patient's ability to complete functional tasks safely. min Patient Education:  YES  Reviewed HEP   []  Progressed/Changed HEP based on:   Cont with HEP     Other Objective/Functional Measures:    Refer to PN     Post Treatment Pain Level (on 0 to 10) scale:   0  / 10     ASSESSMENT    Assessment/Changes in Function:     Refer to PN     []  See Progress Note/Recertification   Patient will continue to benefit from skilled PT services to analyze, cue, progress, modify,, demonstrate, instruct, and address, movement patterns, therapeutic interventions, postural abnormalities, soft tissue restrictions, ROM, strength, functional mobility, body mechanics/ergonomics, and home and community integration, to attain remaining goals.    Progress toward goals / Updated goals:    Refer to PN PLAN    []  Upgrade activities as tolerated YES Continue plan of care   []  Discharge due to :    []  Other:      Therapist: Alverto Freeman, PT, DPT    Date: 2/17/2022 Time: 11:54 AM     Future Appointments   Date Time Provider Clare Grider   2/21/2022 11:00 AM Ashley Moraes [facility]   2/24/2022 11:45 AM Ynes Butler PT [facility]

## 2022-02-21 ENCOUNTER — HOSPITAL ENCOUNTER (OUTPATIENT)
Dept: PHYSICAL THERAPY | Age: 77
Discharge: HOME | End: 2022-02-21
Payer: MEDICARE

## 2022-02-21 PROCEDURE — 97110 THERAPEUTIC EXERCISES: CPT

## 2022-02-21 PROCEDURE — 97112 NEUROMUSCULAR REEDUCATION: CPT

## 2022-02-21 NOTE — PROGRESS NOTES
PHYSICAL THERAPY - DAILY TREATMENT NOTE    Patient Name: Sonia Ya        Date: 2022  : 1945   YES Patient  Verified  Visit #:     Insurance: Payor: Liz Kuo / Plan: [place] MEDICARE PART A & B / Product Type: Medicare /      In time: 11:01 Out time: 11:41   Total Treatment Time: 40     Medicare/BCBS [place] Time Tracking (below)   Total Timed Codes (min):  40 1:1 Treatment Time:  40     TREATMENT AREA =  Other low back pain [M54.59]    SUBJECTIVE    Pain Level (on 0 to 10 scale):  3  / 10   Medication Changes/New allergies or changes in medical history, any new surgeries or procedures? No    If yes, update Summary List   Subjective Functional Status/Changes:  []  No changes reported     States she had an appointment with her cardiologist on 2022 but she had to cancel due to illness. States she has rescheduled the appointment for 2022. States she thinks the fluid pills are helping but she feels her B LE swelling should be less. States she is scheduled in March for a follow up for a \"spot on my lung. \"    \"Before I was afraid to sweep the house. Now I can sweep the whole house and I'm fine. I still have a hard time getting in and out of the car. \"    \"I can't lift my right leg high enough to clip my toenails. Do you have any suggestions? Would the doctor I go to for ingrown toenails be able to clip my toenails? Would insurance cover it? Or should I go to a place that does pedicures? \"         OBJECTIVE    15 min Therapeutic Exercise:  [x]  See flow sheet   Rationale:      increase ROM, increase strength, improve coordination, improve balance, and increase proprioception to improve the patients ability to perform ADL's, gait, and functional mobility with increased safety and independence.      25 min Neuromuscular Re-ed: EO/EC balance exercises, tap ups, dynamic standing reach   Rationale:      increase ROM, increase strength, improve coordination, improve balance, and increase proprioception to improve the patients ability to perfom ADL's, gait, and functional mobility with increased safety and independence. min Patient Education:  YES  Reviewed HEP   []  Progressed/Changed HEP based on:   Advised patient to doctor who takes care of her ingrown toenails to inquire about the possibility of getting her nails trimmed there. Other Objective/Functional Measures:    Advised patient she could trim toenails with seated trunk flexion. Advised to perform standing trunk extension immediately afterward to help avoid or decrease radicular symptoms. EO ROM on AirEx: 30\"  EC Stance on AirEx: 3\"     Post Treatment Pain Level (on 0 to 10) scale:   1-2  / 10     ASSESSMENT    Assessment/Changes in Function:     Patient challenged with standing dynamic reach and sit to stand transfers with feet on AirEx. []  See Progress Note/Recertification   Patient will continue to benefit from skilled PT services to modify and progress therapeutic interventions, address functional mobility deficits, address ROM deficits, address strength deficits, analyze and address soft tissue restrictions, analyze and cue movement patterns, analyze and modify body mechanics/ergonomics, assess and modify postural abnormalities, address imbalance/dizziness and instruct in home and community integration to attain remaining goals. Progress toward goals / Updated goals:    · Goals for this certification period include and are to be achieved in   6  treatments:     1. Patient will score > 24/28 on the Tinetti to increase safety with gait. Cont with balance and strengthening exercises  2. Patient will demonstrate eyes open ROM on airex for 15\" or better to increase safety in challenging environments including grass.  EO ROM on AirEx: 30\"     PLAN    [x]  Upgrade activities as tolerated YES Continue plan of care   []  Discharge due to :    []  Other:      Therapist: Reji Zarate, PT    Date: 2/21/2022 Time: 11:05 AM     Future Appointments   Date Time Provider Clare Grider   2/24/2022 11:45 AM John Montaño PT [facility]   3/1/2022 11:00 AM John Montaño PT [facility]   3/3/2022 11:00 AM John Montaño PT [facility]   3/7/2022 11:45 AM Vaishnavi Prince [facility]   3/10/2022 11:45 AM John Montaño PT [id number]

## 2022-02-24 ENCOUNTER — HOSPITAL ENCOUNTER (OUTPATIENT)
Dept: PHYSICAL THERAPY | Age: 77
Discharge: HOME | End: 2022-02-24
Payer: MEDICARE

## 2022-02-24 PROCEDURE — 97112 NEUROMUSCULAR REEDUCATION: CPT

## 2022-02-24 PROCEDURE — 97110 THERAPEUTIC EXERCISES: CPT

## 2022-02-24 NOTE — PROGRESS NOTES
PHYSICAL THERAPY - DAILY TREATMENT NOTE    Patient Name: Tj Nava        Date: 2022  : 1945   YES Patient  Verified  Visit #:   15   of   19  Insurance: Payor: Oliverio Jenkins / Plan: [place] MEDICARE PART A & B / Product Type: Medicare /      In time: 11:38 Out time: 12:20   Total Treatment Time: 42     Medicare/BCBS [place] Time Tracking (below)   Total Timed Codes (min):  42 1:1 Treatment Time:  42     TREATMENT AREA =  Other low back pain [M54.59]    SUBJECTIVE    Pain Level (on 0 to 10 scale):  0  / 10   Medication Changes/New allergies or changes in medical history, any new surgeries or procedures? NO    If yes, update Summary List   Subjective Functional Status/Changes:  []  No changes reported     Pt reports experiencing similar pain down posterior LLE as she had on RLE, symptoms resolved quickly and did not reoccur. Pt reports improved ability with house hold chores, feels steadier on her feet. OBJECTIVE    25 min Therapeutic Exercise:  [x]  See flow sheet   Rationale:      increase ROM and increase strength to improve the patients ability to perform ADL's with greater ease. 17 min Neuromuscular Re-ed: See flow sheet   Rationale:   improve balance, coordination, proprioception to improve patient's ability to complete functional tasks safely. min Patient Education:  YES  Reviewed HEP   []  Progressed/Changed HEP based on:   Cont with HEP     Other Objective/Functional Measures:    Sit to stands with AE under feet, pt with difficulty maintaining balance and compensated by using posterior legs on table to assist.      Post Treatment Pain Level (on 0 to 10) scale:   0  / 10     ASSESSMENT    Assessment/Changes in Function:     Pt demonstrating improved dynamic stability.  Poor static balance when vision eliminated on altered surface (AE)      []  See Progress Note/Recertification   Patient will continue to benefit from skilled PT services to analyze, cue, progress, modify,, demonstrate, instruct, and address, movement patterns, therapeutic interventions, postural abnormalities, soft tissue restrictions, ROM, strength, functional mobility, body mechanics/ergonomics, and home and community integration, to attain remaining goals. Progress toward goals / Updated goals:    1. Patient will score > 24/28 on the Tinetti to increase safety with gait. Cont with balance and strengthening exercises  2. Patient will demonstrate Kaleb Shelter airex for 15\" or better to increase safety in challenging environments including grass.  EO ROM on AirEx: 30\"       PLAN    []  Upgrade activities as tolerated YES Continue plan of care   []  Discharge due to :    []  Other:      Therapist: Tyson Sales PT, DPT    Date: 2/24/2022 Time: 7:58 AM     Future Appointments   Date Time Provider Clare Grider   2/24/2022 11:45 AM Juanis Lopez, PT [facility]   3/1/2022 11:00 AM Juanis Lopez PT [facility]   3/3/2022 11:00 AM Juanis Lopez PT [facility]   3/7/2022 11:45 AM Lula Otoole [facility]   3/10/2022 11:45 AM Juanis Lopez, ELIANE MAHMOOD [facility]

## 2022-03-01 ENCOUNTER — HOSPITAL ENCOUNTER (OUTPATIENT)
Dept: PHYSICAL THERAPY | Age: 77
Discharge: HOME | End: 2022-03-01
Payer: MEDICARE

## 2022-03-01 PROCEDURE — 97112 NEUROMUSCULAR REEDUCATION: CPT

## 2022-03-01 PROCEDURE — 97110 THERAPEUTIC EXERCISES: CPT

## 2022-03-01 NOTE — PROGRESS NOTES
PHYSICAL THERAPY - DAILY TREATMENT NOTE    Patient Name: Concepcion Gan        Date: 3/1/2022  : 1945   YES Patient  Verified  Visit #:   15   of   19  Insurance: Payor: Gilberto Remy / Plan: [place] MEDICARE PART A & B / Product Type: Medicare /      In time: 11:10 Out time: 11:45   Total Treatment Time: 35     Medicare/BCBS [place] Time Tracking (below)   Total Timed Codes (min):  35 1:1 Treatment Time:  35     TREATMENT AREA =  Other low back pain [M54.59]    SUBJECTIVE    Pain Level (on 0 to 10 scale):   10   Medication Changes/New allergies or changes in medical history, any new surgeries or procedures? NO    If yes, update Summary List   Subjective Functional Status/Changes:  []  No changes reported     Pt reports feeling great after last visit. Increased lateral upper leg pain currently. 2/10          OBJECTIVE    15 min Therapeutic Exercise:  [x]  See flow sheet   Rationale:      increase ROM and increase strength to improve the patients ability to perform ADL's with greater ease. 20 min Neuromuscular Re-ed: See flow sheet   Rationale:   improve balance, coordination, proprioception to improve patient's ability to complete functional tasks safely. min Patient Education:  YES  Reviewed HEP   []  Progressed/Changed HEP based on:   Cont with HEP     Other Objective/Functional Measures:    Cont with outlined TE program emphasizing balance/proprioception      Post Treatment Pain Level (on 0 to 10) scale:    10     ASSESSMENT    Assessment/Changes in Function:     D/C within next 3 visits. []  See Progress Note/Recertification   Patient will continue to benefit from skilled PT services to analyze, cue, progress, modify,, demonstrate, instruct, and address, movement patterns, therapeutic interventions, postural abnormalities, soft tissue restrictions, ROM, strength, functional mobility, body mechanics/ergonomics, and home and community integration, to attain remaining goals. Progress toward goals / Updated goals:    1. Patient will score > 24/28 on the Tinetti to increase safety with gait. Cont with balance and strengthening exercises  2.  Patient will demonstrate Juanis Rodriguez airex for 15\" or better to increase safety in challenging environments including grass. EO ROM on AirEx: 30\"       PLAN    []  Upgrade activities as tolerated YES Continue plan of care   []  Discharge due to :    []  Other:      Therapist: Isabel Juárez, PT, DPT    Date: 3/1/2022 Time: 11:14 AM     Future Appointments   Date Time Provider Clare Grider   3/3/2022 11:00 AM Hemant Garcia PT [facility]   3/7/2022 11:45 AM Zahra Anne [facility]   3/10/2022 11:45 AM Hemant Garcia PT [id number]

## 2022-03-03 ENCOUNTER — HOSPITAL ENCOUNTER (OUTPATIENT)
Dept: PHYSICAL THERAPY | Age: 77
Discharge: HOME | End: 2022-03-03
Payer: MEDICARE

## 2022-03-03 PROCEDURE — 97112 NEUROMUSCULAR REEDUCATION: CPT

## 2022-03-03 PROCEDURE — 97530 THERAPEUTIC ACTIVITIES: CPT

## 2022-03-03 PROCEDURE — 97110 THERAPEUTIC EXERCISES: CPT

## 2022-03-03 NOTE — PROGRESS NOTES
PHYSICAL THERAPY - DAILY TREATMENT NOTE    Patient Name: Radha Martin        Date: 3/3/2022  : 1945   YES Patient  Verified  Visit #:   15   of   19  Insurance: Payor: Georgina Chow / Plan: [place] MED[place] PART A & B / Product Type: Medicare /      In time: 11:04 Out time: 11:48   Total Treatment Time: 44     Medicare/BCBS [place] Time Tracking (below)   Total Timed Codes (min):  44 1:1 Treatment Time:  44     TREATMENT AREA =  Other low back pain [M54.59]  SUBJECTIVE    Pain Level (on 0 to 10 scale):  4  / 10   Medication Changes/New allergies or changes in medical history, any new surgeries or procedures? NO    If yes, update Summary List   Subjective Functional Status/Changes:  []  No changes reported     Pt reports 4/10 right lateral thigh discomfort.           OBJECTIVE    22 min Therapeutic Exercise:  [x]  See flow sheet   Rationale:      increase ROM, increase strength, improve coordination, improve balance and increase proprioception to improve the patients ability to perform ADLs/IADLs, functional mobility and gait safely and independently without increased pain/symptoms     14 min Therapeutic Activity: Sit to stand, stair negotiation   Rationale: improve functional mobility and gait to improve the patient's ability to perform ADLs/IADLs, functional mobility and gait safely and independently without increased pain/symptoms      8 min Neuromuscular Re-ed: EO/EC balance on floor/foam   Rationale: improve balance to improve the patient's ability to perform ADLs/IADLs, functional mobility and gait safely and independently without increased pain/symptoms      During TE/NM min Patient Education:  YES  Reviewed HEP   []  Progressed/Changed HEP based on:   Cont HEP     Other Objective/Functional Measures:    Exercises per flow sheet    Stair negotiation:  Ascends 4 steps with B HR with reciprocal gait pattern, descending leading with left LE twice  Ascends 4 steps with left AHR ascending sideways with step-to gait pattern leading with right LE, descending sideways with step-to gait pattern leading with left LE twice  Demonstrates difficulty descending stairs with left AHR only due to difficulty abducting left LE; however, unable when attempting to descend facing forward     Post Treatment Pain Level (on 0 to 10) scale:   4  / 10     ASSESSMENT    Assessment/Changes in Function:     Tolerated exercises with minimal complaints     []  See Progress Note/Recertification   Patient will continue to benefit from skilled PT services to modify and progress therapeutic interventions, address functional mobility deficits, address ROM deficits, address strength deficits, analyze and address soft tissue restrictions, analyze and cue movement patterns, analyze and modify body mechanics/ergonomics, assess and modify postural abnormalities, address imbalance/dizziness and instruct in home and community integration to attain remaining goals. Progress toward goals / Updated goals:    Progressing toward goals:  1. Patient will score > 24/28 on the Tinetti to increase safety with gait. 2. Patient will demonstrate eyes open ROM on airex for 15\" or better to increase safety in challenging environments including grass.        PLAN    [x]  Upgrade activities as tolerated YES Continue plan of care   []  Discharge due to :    []  Other:      Therapist: Sharla Espinoza, PT    Date: 3/3/2022 Time: 11:08 AM     Future Appointments   Date Time Provider Clare Grider   3/7/2022 11:45 AM Florette Ora [facility]   3/10/2022 11:45 AM [facility] Dexter[facility]

## 2022-03-07 ENCOUNTER — HOSPITAL ENCOUNTER (OUTPATIENT)
Dept: PHYSICAL THERAPY | Age: 77
Discharge: HOME | End: 2022-03-07
Payer: MEDICARE

## 2022-03-07 PROCEDURE — 97530 THERAPEUTIC ACTIVITIES: CPT

## 2022-03-07 PROCEDURE — 97110 THERAPEUTIC EXERCISES: CPT

## 2022-03-07 NOTE — PROGRESS NOTES
PHYSICAL THERAPY - DAILY TREATMENT NOTE    Patient Name: Guerrero Dahl        Date: 3/7/2022  : 1945   YES Patient  Verified  Visit #:     Insurance: Payor: Everardo Shelton / Plan: [place] MEDICARE PART A & B / Product Type: Medicare /      In time: 11:47 Out time: 12:42   Total Treatment Time: 55     Medicare/BCBS [place] Time Tracking (below)   Total Timed Codes (min):  45 1:1 Treatment Time:  45     TREATMENT AREA =  Other low back pain [M54.59]    SUBJECTIVE    Pain Level (on 0 to 10 scale):  5  / 10   Medication Changes/New allergies or changes in medical history, any new surgeries or procedures? NO    If yes, update Summary List   Subjective Functional Status/Changes:  []  No changes reported     Pt reports she felt pretty good after her last session, on Saturday she did a lot of running around with her son, so she was in and out of the car,  she was really sore and still a little sore today. Pt states she is still doing the back extension exercise and it helps with her leg most of the time, but not all of th time, reports it only helps if \"I can catch it in time. \"    Pt reports her family had to help her on the curbs, states stepping down harder than going up.         OBJECTIVE    Modalities Rationale: decrease pain to improve patient's ability to perform transfers, gait and bed mobility with less pain   min [] Estim, type/location:                                      []  att     []  unatt     []  w/US     []  w/ice    []  w/heat    min []  Mechanical Traction: type/lbs                   []  pro   []  sup   []  int   []  cont    []  before manual    []  after manual    min []  Ultrasound, settings/location:      min []  Iontophoresis w/ dexamethasone, location:                                               []  take home patch       []  in clinic   10 min []  Ice     [x]  Heat    location/position: MHP to L/S, Patient supine with LE wedge    min []  Vasopneumatic Device, press/temp: min []  Other:    [x] Skin assessment post-treatment (if applicable):    [x]  intact    [x]  redness- no adverse reaction     []redness - adverse reaction:      33 min Therapeutic Exercise:  [x]  See flow sheet   Rationale:    increase ROM, increase strength, improve coordination, improve balance and increase proprioception to promote increased functional mobility and increased activity tolerance with ADL's.    12 min Therapeutic Activity: curb negotiation   sit<>stand   stance with multidirectional reaching   Rationale:    increase ROM, increase strength, improve coordination, improve balance and increase proprioception to promote increased functional mobility and increased activity tolerance with ADL's.     min Patient Education:  YES  Reviewed HEP   []  Progressed/Changed HEP based on:   Patient with no questions, continue same HEP     Other Objective/Functional Measures:    Pt c/o right lateral thigh pain and demo's difficulty lifting/moving right LE with bed mobility. Practiced curb negotiation with 4\" step and rollator. Mod to max VCing for safety with management of AD (breaks don't work well) and for stepping pattern. Pt does best ascending curb with left LE leading and descending with right LE leading. Post Treatment Pain Level (on 0 to 10) scale:   6  / 10     ASSESSMENT    Assessment/Changes in Function:     Pt with increased right LE radiculopathy from increased walking activity over the weekend. []  See Progress Note/Recertification   Patient will continue to benefit from skilled PT services to modify and progress therapeutic interventions, address functional mobility deficits, address ROM deficits, address strength deficits, analyze and address soft tissue restrictions, analyze and cue movement patterns, assess and modify postural abnormalities, and instruct in home and community integration to attain remaining goals. Progress toward goals / Updated goals:    1.  Patient will score > 24/28 on the Tinetti to increase safety with gait. sit<>stand and dynamic reaching for balance   2. Patient will demonstrate eyes open ROM on airex for 15\" or better to increase safety in challenging environments including grass.      PLAN    []  Upgrade activities as tolerated YES Continue plan of care   []  Discharge due to :    []  Other:      Therapist: Elsy Sewell PTA    Date: 3/7/2022 Time: 11:51 AM     Future Appointments   Date Time Provider Clare Grider   3/10/2022 11:45 AM Sabine Roberts PT [facility]

## 2022-03-10 ENCOUNTER — HOSPITAL ENCOUNTER (OUTPATIENT)
Dept: PHYSICAL THERAPY | Age: 77
Discharge: HOME | End: 2022-03-10
Payer: MEDICARE

## 2022-03-10 PROCEDURE — 97110 THERAPEUTIC EXERCISES: CPT

## 2022-03-10 PROCEDURE — 97530 THERAPEUTIC ACTIVITIES: CPT

## 2022-03-10 NOTE — PROGRESS NOTES
[address] PHYSICAL THERAPY  [address] [place] [address] Patricia Chisholm, [address] - Phone: [phone]  Fax: [phone] SUMMARY FOR PHYSICAL THERAPY          Patient Name: Suzanne Ling : 1945   Treatment/Medical Diagnosis: Other low back pain [M54.59]   Referral Source: Katherine Alva MD Start of [facility]): 2021   Prior Hospitalization: See medical history Provider #: [id number]   Visits from [facility]: 17 Missed Visits: 2     Goal/Measure of Progress Goal Met? 1. Patient will score > 24/28 on the Tinetti to increase safety with gait. Status at last Eval:  Current Status:  no   2. Patient will demonstrate eyes open ROM for 15\" or better to increase safety in challenging environments including grass. Status at last Eval: - Current Status: 30\" yes     Key Functional Changes/Progress: Reassessment performed today. Pt reports a 85% improvement in symptoms since initiating PT. She denies any recent falls. Objectively she demonstrates improved ABC score and Tinetti score placing her into a moderate fall risk category (initially in high fall risk category). Pt is able to demonstrate independence with learned exercises. Pt has met majority of of her short and long term goals at this time and is being discharged today secondary to above factors. Pt was educated to continue with daily exercise program and to contact the clinic should symptoms return or worsen. Thank you for allowing me the opportunity to assist in this case. Assessments/Recommendations: Discontinue therapy. Progressing towards or have reached established goals. If you have any questions/comments please contact us directly at [phone]. Thank you for allowing us to assist in the care of your patient.     Therapist Signature: Reza Whitaker PT, DPT Date: 3/10/2022   Reporting Period: 2022 - 3/10/2022  Time: 1:44 AM      Certification Period: 2021 - 3/20/2022       NOTE TO PHYSICIAN:  PLEASE COMPLETE THE ORDERS BELOW AND FAX TO   [facility] Physical Therapy: [phone]. If you are unable to process this request in 24 hours please contact our office: [phone].    ___ I have read the above report and request that my patient be discharged from therapy.      Physician Signature:        Date:       Time:

## 2022-03-10 NOTE — PROGRESS NOTES
PHYSICAL THERAPY - DAILY TREATMENT NOTE    Patient Name: Radha Martin        Date: 3/10/2022  : 1945   YES Patient  Verified  Visit #:     Insurance: Payor: Georgina Chow / Plan: [place] MEDICARE PART A & B / Product Type: Medicare /      In time: 11:45 Out time: 12:20   Total Treatment Time: 35     Medicare/BCBS [place] Time Tracking (below)   Total Timed Codes (min):  35 1:1 Treatment Time:  35     TREATMENT AREA =  Other low back pain [M54.59]    SUBJECTIVE    Pain Level (on 0 to 10 scale):  0  / 10   Medication Changes/New allergies or changes in medical history, any new surgeries or procedures? NO    If yes, update Summary List   Subjective Functional Status/Changes:  []  No changes reported     Pt reports feeling better than last visit. Is prepared to be DC today. Reports compliance with HEP and is starting exercise regime outside of clinic. OBJECTIVE    15 min Therapeutic Exercise:  [x]  See flow sheet   Rationale:      increase ROM and increase strength to improve the patients ability to perform ADL's with greater ease. 20 min Therapeutic Activity: See flow sheet  FOTO and reassessment    Rationale:   increase mobility, endurance, and strength to improve patient's ability to complete functional tasks with greater ease.       min Patient Education:  YES  Reviewed HEP   []  Progressed/Changed HEP based on:   Cont with HEP     Other Objective/Functional Measures:    Refer to DC     Post Treatment Pain Level (on 0 to 10) scale:   0  / 10     ASSESSMENT    Assessment/Changes in Function:     Refer to DC     []  See Progress Note/Recertification   Patient will continue to benefit from skilled PT services to analyze, cue, progress, modify,, demonstrate, instruct, and address, movement patterns, therapeutic interventions, postural abnormalities, soft tissue restrictions, ROM, strength, functional mobility, body mechanics/ergonomics, and home and community integration, to attain remaining goals.    Progress toward goals / Updated goals:    Refer to DC     PLAN    []  Upgrade activities as tolerated NO Continue plan of care   []  Discharge due to :    []  Other:      Therapist: Tiki Min PT, DPT    Date: 3/10/2022 Time: 9:01 AM     Future Appointments   Date Time Provider Clare Grider   3/10/2022 11:45 AM [facility]

## 2022-05-25 ENCOUNTER — TRANSCRIBE ORDER (OUTPATIENT)
Dept: SCHEDULING | Age: 77
End: 2022-05-25

## 2022-05-25 DIAGNOSIS — Z12.31 ENCOUNTER FOR SCREENING MAMMOGRAM FOR BREAST CANCER: Primary | ICD-10-CM

## 2022-11-07 ENCOUNTER — TRANSCRIBE ORDER (OUTPATIENT)
Dept: SCHEDULING | Age: 77
End: 2022-11-07

## 2022-11-07 DIAGNOSIS — Z12.31 VISIT FOR SCREENING MAMMOGRAM: Primary | ICD-10-CM

## 2022-12-02 ENCOUNTER — HOSPITAL ENCOUNTER (OUTPATIENT)
Dept: WOMENS IMAGING | Age: 77
Discharge: HOME | End: 2022-12-02
Attending: FAMILY MEDICINE
Payer: MEDICARE

## 2022-12-02 DIAGNOSIS — Z12.31 VISIT FOR SCREENING MAMMOGRAM: ICD-10-CM

## 2022-12-02 PROCEDURE — 77063 BREAST TOMOSYNTHESIS BI: CPT

## 2022-12-29 ENCOUNTER — HOSPITAL ENCOUNTER (OUTPATIENT)
Dept: PHYSICAL THERAPY | Age: 77
End: 2022-12-29
Payer: MEDICARE

## 2022-12-29 PROCEDURE — 97161 PT EVAL LOW COMPLEX 20 MIN: CPT

## 2022-12-29 PROCEDURE — 97110 THERAPEUTIC EXERCISES: CPT

## 2022-12-29 PROCEDURE — 97116 GAIT TRAINING THERAPY: CPT

## 2022-12-29 NOTE — PROGRESS NOTES
[address] [place] [address] PHYSICAL THERAPY  [address] [place] [address] Priscilla Chisholm, [address] - Phone: [phone]  Fax: [phone] / [facility]  Patient Name: Machelle Pathak : 1945   Medical   Diagnosis: Pain in left knee [M25.562] Treatment Diagnosis: Left Knee Pain   Onset Date: 2022     Referral Source: Janette Cervantes MD Start of [facility]): 2022   Prior Hospitalization: See medical history Provider #: [id number]   Prior Level of Function: Reliant on rollator for mobility   Comorbidities: B CHELY, OA   Medications: Verified on Patient Summary List   The Plan of Care and following information is based on the information from the initial evaluation.   ===========================================================================================  Assessment / key information: Patient is a 68 y.o. female presenting with rollator with CC B knee pain, left > right. Patient reports left knee pain worsened 2022 as she drove for her various doctor's appointments until she was no longer able to drive. Pain limits her ability to walk/stand for any distance or time. She is now scheduled for TKA on 22.  Patient provided HEP to be followed until her surgery, after which she will either go to inpatient rehab, home health PT, or outpatient PT.===========================================================================================  Eval Complexity: History: HIGH Complexity :3+ comorbidities / personal factors will impact the outcome/ POC Exam:LOW Complexity : 1-2 Standardized tests and measures addressing body structure, function, activity limitation and / or participation in recreation  Presentation: LOW Complexity : Stable, uncomplicated  Clinical Decision Making:MEDIUM Complexity : FOTO score of 26-74Overall Complexity:LOW   Problem List: pain affecting function, decrease ROM, decrease strength, edema affecting function, impaired gait/ balance, decrease ADL/ functional abilitiies, decrease activity tolerance, decrease flexibility/ joint mobility, and decrease transfer abilities  FOTO score: 26 indicating 74% functional disability  Treatment Plan may include any combination of the following: Therapeutic exercise, Gait training, and Other: HEP  Patient / Family readiness to learn indicated by: asking questions, trying to perform skills, and interest  Persons(s) to be included in education: patient (P)  Barriers to Learning/Limitations: None  Measures taken: N/a   Patient Goal (s): \"Learn exercises before surgery\"   Patient self reported health status: good  Rehabilitation Potential: good  Goals: To be accomplished in  1  treatments:  1. Patient to be establish HEP to facilitate pain control and gains in ROM/strength prior to scheduled sx. Frequency / Duration:  1 session only (including evaluation)     Patient / Caregiver education and instruction: activity modification and exercises. Therapist Signature: Elaine Reed \"BJ\" Samuel Gomez DPT, Cert. MDT, Cert. DN, Cert. SMT, Dip. Osteopractic Date: 94/12/6468   Certification Period: 12/29/22-3/28/23 Time: 2:49 PM   ===========================================================================================  I certify that the above Physical Therapy Services are being furnished while the patient is under my care. I agree with the treatment plan and certify that this therapy is necessary. Physician Signature:        Date:       Time:                                         Fanny Kitchen MD   Please sign and return to [facility] or you may fax the signed copy to [phone]. Thank you.

## 2023-01-31 DIAGNOSIS — Z12.31 ENCOUNTER FOR SCREENING MAMMOGRAM FOR BREAST CANCER: Primary | ICD-10-CM

## 2023-02-03 DIAGNOSIS — Z12.31 ENCOUNTER FOR SCREENING MAMMOGRAM FOR BREAST CANCER: Primary | ICD-10-CM

## 2023-11-15 ENCOUNTER — TRANSCRIBE ORDERS (OUTPATIENT)
Facility: HOSPITAL | Age: 78
End: 2023-11-15

## 2023-11-15 DIAGNOSIS — Z12.31 SCREENING MAMMOGRAM FOR HIGH-RISK PATIENT: Primary | ICD-10-CM

## 2023-11-22 ENCOUNTER — HOSPITAL ENCOUNTER (OUTPATIENT)
Dept: WOMENS IMAGING | Facility: HOSPITAL | Age: 78
Discharge: HOME | End: 2023-11-25
Payer: MEDICARE

## 2023-11-22 VITALS — WEIGHT: 125 LBS | BODY MASS INDEX: 24.54 KG/M2 | HEIGHT: 60 IN

## 2023-11-22 DIAGNOSIS — Z12.31 SCREENING MAMMOGRAM FOR HIGH-RISK PATIENT: ICD-10-CM

## 2023-11-22 PROCEDURE — 77063 BREAST TOMOSYNTHESIS BI: CPT

## 2024-11-20 ENCOUNTER — TRANSCRIBE ORDERS (OUTPATIENT)
Facility: HOSPITAL | Age: 79
End: 2024-11-20

## 2024-11-20 DIAGNOSIS — Z12.31 ENCOUNTER FOR SCREENING MAMMOGRAM FOR HIGH-RISK PATIENT: Primary | ICD-10-CM

## 2024-12-09 ENCOUNTER — HOSPITAL ENCOUNTER (OUTPATIENT)
Dept: WOMENS IMAGING | Facility: HOSPITAL | Age: 79
Discharge: HOME | End: 2024-12-12
Payer: MEDICARE

## 2024-12-09 VITALS — WEIGHT: 125 LBS | BODY MASS INDEX: 24.54 KG/M2 | HEIGHT: 60 IN

## 2024-12-09 DIAGNOSIS — Z12.31 ENCOUNTER FOR SCREENING MAMMOGRAM FOR HIGH-RISK PATIENT: ICD-10-CM

## 2024-12-09 PROCEDURE — 77063 BREAST TOMOSYNTHESIS BI: CPT

## 2025-01-14 ENCOUNTER — HOSPITAL ENCOUNTER (OUTPATIENT)
Dept: WOMENS IMAGING | Facility: HOSPITAL | Age: 80
Discharge: HOME | End: 2025-01-17
Payer: MEDICARE

## 2025-01-14 ENCOUNTER — HOSPITAL ENCOUNTER (OUTPATIENT)
Facility: HOSPITAL | Age: 80
Discharge: HOME | End: 2025-01-17
Payer: MEDICARE

## 2025-01-14 DIAGNOSIS — N64.89 BREAST ASYMMETRY: ICD-10-CM

## 2025-01-14 DIAGNOSIS — R92.8 ABNORMAL MAMMOGRAM: ICD-10-CM

## 2025-01-14 PROCEDURE — G0279 TOMOSYNTHESIS, MAMMO: HCPCS

## 2025-01-14 PROCEDURE — 76642 ULTRASOUND BREAST LIMITED: CPT
